# Patient Record
Sex: FEMALE | Race: WHITE | URBAN - METROPOLITAN AREA
[De-identification: names, ages, dates, MRNs, and addresses within clinical notes are randomized per-mention and may not be internally consistent; named-entity substitution may affect disease eponyms.]

---

## 2024-11-12 ENCOUNTER — CLAIMS CREATED FROM THE CLAIM WINDOW (OUTPATIENT)
Dept: URBAN - METROPOLITAN AREA MEDICAL CENTER 1 | Facility: MEDICAL CENTER | Age: 49
End: 2024-11-12
Payer: OTHER GOVERNMENT

## 2024-11-12 DIAGNOSIS — Z87.11 H/O GASTRIC ULCER: ICD-10-CM

## 2024-11-12 DIAGNOSIS — K92.1 ACUTE MELENA: ICD-10-CM

## 2024-11-12 DIAGNOSIS — D62 ABLA (ACUTE BLOOD LOSS ANEMIA): ICD-10-CM

## 2024-11-12 DIAGNOSIS — K92.0 BLOODY EMESIS: ICD-10-CM

## 2024-11-12 PROCEDURE — 99255 IP/OBS CONSLTJ NEW/EST HI 80: CPT | Performed by: INTERNAL MEDICINE

## 2024-11-12 PROCEDURE — G8427 DOCREV CUR MEDS BY ELIG CLIN: HCPCS | Performed by: INTERNAL MEDICINE

## 2024-11-12 PROCEDURE — 99223 1ST HOSP IP/OBS HIGH 75: CPT | Performed by: INTERNAL MEDICINE

## 2024-11-13 ENCOUNTER — CLAIMS CREATED FROM THE CLAIM WINDOW (OUTPATIENT)
Dept: URBAN - METROPOLITAN AREA MEDICAL CENTER 1 | Facility: MEDICAL CENTER | Age: 49
End: 2024-11-13

## 2024-11-13 PROCEDURE — 43235 EGD DIAGNOSTIC BRUSH WASH: CPT | Performed by: INTERNAL MEDICINE

## 2024-11-14 ENCOUNTER — CLAIMS CREATED FROM THE CLAIM WINDOW (OUTPATIENT)
Dept: URBAN - METROPOLITAN AREA MEDICAL CENTER 1 | Facility: MEDICAL CENTER | Age: 49
End: 2024-11-14
Payer: OTHER GOVERNMENT

## 2024-11-14 DIAGNOSIS — K92.0 BLOODY EMESIS: ICD-10-CM

## 2024-11-14 DIAGNOSIS — K92.1 ACUTE MELENA: ICD-10-CM

## 2024-11-14 DIAGNOSIS — D62 ABLA (ACUTE BLOOD LOSS ANEMIA): ICD-10-CM

## 2024-11-14 DIAGNOSIS — Z87.11 H/O GASTRIC ULCER: ICD-10-CM

## 2024-11-14 PROCEDURE — 99232 SBSQ HOSP IP/OBS MODERATE 35: CPT | Performed by: INTERNAL MEDICINE

## 2024-11-27 ENCOUNTER — OFFICE VISIT (OUTPATIENT)
Dept: URBAN - NONMETROPOLITAN AREA CLINIC 4 | Facility: CLINIC | Age: 49
End: 2024-11-27

## 2024-12-10 ENCOUNTER — OFFICE VISIT (OUTPATIENT)
Dept: URBAN - NONMETROPOLITAN AREA CLINIC 4 | Facility: CLINIC | Age: 49
End: 2024-12-10
Payer: OTHER GOVERNMENT

## 2024-12-10 ENCOUNTER — DASHBOARD ENCOUNTERS (OUTPATIENT)
Age: 49
End: 2024-12-10

## 2024-12-10 ENCOUNTER — LAB OUTSIDE AN ENCOUNTER (OUTPATIENT)
Dept: URBAN - NONMETROPOLITAN AREA CLINIC 4 | Facility: CLINIC | Age: 49
End: 2024-12-10

## 2024-12-10 VITALS
WEIGHT: 95.8 LBS | HEART RATE: 86 BPM | HEIGHT: 66 IN | SYSTOLIC BLOOD PRESSURE: 146 MMHG | BODY MASS INDEX: 15.39 KG/M2 | DIASTOLIC BLOOD PRESSURE: 83 MMHG | TEMPERATURE: 97.8 F

## 2024-12-10 DIAGNOSIS — K25.0 ACUTE GASTRIC ULCER WITH HEMORRHAGE: ICD-10-CM

## 2024-12-10 DIAGNOSIS — K92.2 UPPER GI BLEEDING: ICD-10-CM

## 2024-12-10 PROCEDURE — 99214 OFFICE O/P EST MOD 30 MIN: CPT | Performed by: REGISTERED NURSE

## 2024-12-10 RX ORDER — LISINOPRIL 20 MG/1
TABLET ORAL
Qty: 90 TABLET | Status: ACTIVE | COMMUNITY

## 2024-12-10 RX ORDER — ATORVASTATIN CALCIUM 10 MG/1
TABLET, FILM COATED ORAL
Qty: 90 TABLET | Status: ACTIVE | COMMUNITY

## 2024-12-10 RX ORDER — PANTOPRAZOLE SODIUM 40 MG/1
TABLET, DELAYED RELEASE ORAL
Qty: 60 TABLET | Status: ACTIVE | COMMUNITY

## 2024-12-10 RX ORDER — VENLAFAXINE HYDROCHLORIDE 75 MG/1
TABLET ORAL
Qty: 0 | Refills: 0 | Status: ACTIVE | COMMUNITY
Start: 1900-01-01

## 2024-12-10 NOTE — HPI-TODAY'S VISIT:
12/10/24: Pt RTC for hospital f/u. Admitted to [facility] for upper GI bleed. Underwent EGD on 11/13/24 by Dr. Tilley, which showed oozing  with visible vessel s/p injection, hemoclips and hot snare. Since discharge, she denies any further melena. She has been taking pantoprazole BID. She stopped taking Goodys powder.